# Patient Record
Sex: MALE | Race: WHITE | URBAN - NONMETROPOLITAN AREA
[De-identification: names, ages, dates, MRNs, and addresses within clinical notes are randomized per-mention and may not be internally consistent; named-entity substitution may affect disease eponyms.]

---

## 2017-03-29 LAB
CHOLESTEROL, TOTAL: 186 MG/DL
CHOLESTEROL/HDL RATIO: 4.8
HDLC SERPL-MCNC: 39 MG/DL (ref 35–70)
LDL CHOLESTEROL CALCULATED: 134.4 MG/DL (ref 0–160)
TRIGL SERPL-MCNC: 63 MG/DL
VLDLC SERPL CALC-MCNC: 13 MG/DL

## 2017-12-07 ENCOUNTER — TELEPHONE (OUTPATIENT)
Dept: FAMILY MEDICINE CLINIC | Age: 62
End: 2017-12-07

## 2017-12-07 DIAGNOSIS — M15.9 PRIMARY OSTEOARTHRITIS INVOLVING MULTIPLE JOINTS: Primary | ICD-10-CM

## 2017-12-07 NOTE — TELEPHONE ENCOUNTER
Pt called and stated he has been taking meloxicam for some time and was told that he would need a blood test to ck his kidneys since he is taking this medication and is requesting an order for labwork

## 2017-12-08 LAB
AGE FOR GFR: 62
ANION GAP SERPL CALCULATED.3IONS-SCNC: 15 MMOL/L
BUN BLDV-MCNC: 20 MG/DL
CALCIUM SERPL-MCNC: 9.7 MG/DL
CHLORIDE BLD-SCNC: 102 MMOL/L
CO2: 30 MMOL/L
CREAT SERPL-MCNC: 1.2 MG/DL
EGFR BF: 55 ML/MIN/1.73 M2
EGFR BM: 74 ML/MIN/1.73 M2
EGFR WF: 46 ML/MIN/1.73 M2
EGFR WM: 61 ML/MIN/1.73 M2
GLUCOSE: 103 MG/DL
POTASSIUM SERPL-SCNC: 4.8 MMOL/L
SODIUM BLD-SCNC: 142 MMOL/L

## 2017-12-11 DIAGNOSIS — G89.29 OTHER CHRONIC PAIN: Primary | ICD-10-CM

## 2017-12-11 RX ORDER — MELOXICAM 15 MG/1
15 TABLET ORAL DAILY
Qty: 30 TABLET | Refills: 3 | Status: SHIPPED | OUTPATIENT
Start: 2017-12-11 | End: 2018-03-15 | Stop reason: SDUPTHER

## 2017-12-11 RX ORDER — MELOXICAM 15 MG/1
1 TABLET ORAL DAILY
Refills: 0 | COMMUNITY
Start: 2017-11-11 | End: 2017-12-11 | Stop reason: SDUPTHER

## 2017-12-20 ENCOUNTER — OFFICE VISIT (OUTPATIENT)
Dept: FAMILY MEDICINE CLINIC | Age: 62
End: 2017-12-20
Payer: COMMERCIAL

## 2017-12-20 VITALS
WEIGHT: 207 LBS | BODY MASS INDEX: 28.04 KG/M2 | HEIGHT: 72 IN | DIASTOLIC BLOOD PRESSURE: 72 MMHG | HEART RATE: 60 BPM | SYSTOLIC BLOOD PRESSURE: 112 MMHG

## 2017-12-20 VITALS
OXYGEN SATURATION: 96 % | DIASTOLIC BLOOD PRESSURE: 76 MMHG | HEART RATE: 60 BPM | RESPIRATION RATE: 12 BRPM | WEIGHT: 209.44 LBS | TEMPERATURE: 97.9 F | BODY MASS INDEX: 28.37 KG/M2 | HEIGHT: 72 IN | SYSTOLIC BLOOD PRESSURE: 110 MMHG

## 2017-12-20 DIAGNOSIS — Z23 NEED FOR INFLUENZA VACCINATION: ICD-10-CM

## 2017-12-20 DIAGNOSIS — R06.02 SOB (SHORTNESS OF BREATH) ON EXERTION: ICD-10-CM

## 2017-12-20 DIAGNOSIS — J40 BRONCHITIS: Primary | ICD-10-CM

## 2017-12-20 PROBLEM — E78.5 HYPERLIPIDEMIA: Status: ACTIVE | Noted: 2017-12-20

## 2017-12-20 PROCEDURE — 99213 OFFICE O/P EST LOW 20 MIN: CPT | Performed by: NURSE PRACTITIONER

## 2017-12-20 PROCEDURE — 90471 IMMUNIZATION ADMIN: CPT | Performed by: NURSE PRACTITIONER

## 2017-12-20 PROCEDURE — 90688 IIV4 VACCINE SPLT 0.5 ML IM: CPT | Performed by: NURSE PRACTITIONER

## 2017-12-20 RX ORDER — LORATADINE 10 MG/1
10 TABLET ORAL DAILY
COMMUNITY
End: 2019-10-24 | Stop reason: ALTCHOICE

## 2017-12-20 RX ORDER — ALBUTEROL SULFATE 90 UG/1
2 AEROSOL, METERED RESPIRATORY (INHALATION) EVERY 4 HOURS PRN
Qty: 1 INHALER | Refills: 1 | Status: SHIPPED | OUTPATIENT
Start: 2017-12-20 | End: 2019-10-24 | Stop reason: ALTCHOICE

## 2017-12-20 RX ORDER — AZITHROMYCIN 250 MG/1
TABLET, FILM COATED ORAL
Qty: 1 PACKET | Refills: 0 | Status: SHIPPED | OUTPATIENT
Start: 2017-12-20 | End: 2017-12-24

## 2017-12-20 ASSESSMENT — ENCOUNTER SYMPTOMS
COUGH: 1
WHEEZING: 1
VOICE CHANGE: 1
SHORTNESS OF BREATH: 1
ABDOMINAL PAIN: 0
NAUSEA: 0
SINUS PRESSURE: 1
RHINORRHEA: 1
DIARRHEA: 0
SORE THROAT: 0

## 2017-12-20 NOTE — PROGRESS NOTES
[address] [address] [address]  Dept: [phone]  Dept Fax: [phone]      Rodrigo Awan is a 58 y.o. male who presents today for his medical conditions/complaints as noted below. Chief Complaint   Patient presents with    Chest Congestion     started over the weekend and can't shake it    Nasal Congestion    Cough     productive cough \" greenish/ yellow in color\"       HPI:     URI    This is a new problem. The current episode started in the past 7 days (6 days ago). There has been no fever. Associated symptoms include congestion, coughing (burns- prductive green/yellow), rhinorrhea (\"some\") and wheezing. Pertinent negatives include no abdominal pain, chest pain, diarrhea, headaches, nausea or sore throat. He has tried NSAIDs for the symptoms. The treatment provided moderate relief. Current Outpatient Prescriptions   Medication Sig Dispense Refill    albuterol sulfate HFA (PROAIR HFA) 108 (90 Base) MCG/ACT inhaler Inhale 2 puffs into the lungs every 4 hours as needed for Wheezing or Shortness of Breath Use with spacer 1 Inhaler 1    meloxicam (MOBIC) 15 MG tablet Take 1 tablet by mouth daily 30 tablet 3    loratadine (CLARITIN) 10 MG tablet Take 10 mg by mouth daily       No current facility-administered medications for this visit.       Allergies   Allergen Reactions    Penicillins Hives       Past Medical History:   Diagnosis Date    ACL tear     History of meniscal tear      Past Surgical History:   Procedure Laterality Date    APPENDECTOMY  1980    COLONOSCOPY  2004    for iron deficiency thought to be due to frequent blood donations    COLONOSCOPY  05/19/2014    normal exam - Dr Payne Flatten    x 2   [facility]     Social History     Social History    Marital status:      Spouse name: N/A    Number of children: N/A    Years of education: N/A     Occupational History    Not on file. Social History Main Topics    Smoking status: Never Smoker    Smokeless tobacco: Never Used    Alcohol use Not on file    Drug use: Unknown    Sexual activity: Not on file     Other Topics Concern    Not on file     Social History Narrative    No narrative on file     Family History   Problem Relation Age of Onset    High Blood Pressure Father     Other Father      knee replacement    Other Brother      hepatitis C       Subjective:      Review of Systems   Constitutional: Positive for appetite change (decreased- improving). Negative for chills, fatigue and fever. HENT: Positive for congestion, postnasal drip, rhinorrhea (\"some\"), sinus pressure (frontal) and voice change. Negative for sore throat. Respiratory: Positive for cough (burns- prductive green/yellow), shortness of breath (with activity) and wheezing. Cardiovascular: Negative for chest pain and palpitations. Gastrointestinal: Negative for abdominal pain, diarrhea and nausea. Neurological: Positive for light-headedness. Negative for dizziness and headaches. Objective:     /76 (Site: Left Arm, Position: Sitting, Cuff Size: Large Adult)   Pulse 60   Temp 97.9 °F (36.6 °C) (Tympanic)   Resp 12   Ht 6' 0.05\" (1.83 m)   Wt 209 lb 7 oz (95 kg)   SpO2 96%   BMI 28.37 kg/m²     Physical Exam   Constitutional: He is oriented to person, place, and time. He appears well-developed and well-nourished. HENT:   Head: Normocephalic. Right Ear: Tympanic membrane normal.   Left Ear: Tympanic membrane normal.   Nose: Mucosal edema, rhinorrhea and sinus tenderness present. Right sinus exhibits frontal sinus tenderness. Left sinus exhibits frontal sinus tenderness. Mouth/Throat: Posterior oropharyngeal erythema present. Eyes: Conjunctivae are normal.   Neck: Neck supple. No thyromegaly present. Cardiovascular: Normal rate, regular rhythm and normal heart sounds.     Pulmonary/Chest: Effort normal and

## 2018-03-15 ENCOUNTER — OFFICE VISIT (OUTPATIENT)
Dept: FAMILY MEDICINE CLINIC | Age: 63
End: 2018-03-15
Payer: COMMERCIAL

## 2018-03-15 VITALS
DIASTOLIC BLOOD PRESSURE: 80 MMHG | SYSTOLIC BLOOD PRESSURE: 120 MMHG | WEIGHT: 216 LBS | HEART RATE: 64 BPM | BODY MASS INDEX: 29.26 KG/M2

## 2018-03-15 DIAGNOSIS — Z00.00 EXAMINATION, MEDICAL, GENERAL: Primary | ICD-10-CM

## 2018-03-15 DIAGNOSIS — Z12.5 SCREENING PSA (PROSTATE SPECIFIC ANTIGEN): ICD-10-CM

## 2018-03-15 DIAGNOSIS — M17.11 PRIMARY OSTEOARTHRITIS OF RIGHT KNEE: ICD-10-CM

## 2018-03-15 DIAGNOSIS — Z11.59 NEED FOR HEPATITIS C SCREENING TEST: ICD-10-CM

## 2018-03-15 DIAGNOSIS — E78.2 MIXED HYPERLIPIDEMIA: ICD-10-CM

## 2018-03-15 PROCEDURE — 99396 PREV VISIT EST AGE 40-64: CPT | Performed by: FAMILY MEDICINE

## 2018-03-15 RX ORDER — MELOXICAM 15 MG/1
15 TABLET ORAL DAILY
Qty: 90 TABLET | Refills: 3 | Status: SHIPPED | OUTPATIENT
Start: 2018-03-15 | End: 2019-10-24 | Stop reason: ALTCHOICE

## 2018-03-15 ASSESSMENT — ENCOUNTER SYMPTOMS
ABDOMINAL PAIN: 0
WHEEZING: 0
ABDOMINAL DISTENTION: 0
CHEST TIGHTNESS: 0
SHORTNESS OF BREATH: 0
SORE THROAT: 0

## 2018-03-15 ASSESSMENT — PATIENT HEALTH QUESTIONNAIRE - PHQ9
1. LITTLE INTEREST OR PLEASURE IN DOING THINGS: 0
SUM OF ALL RESPONSES TO PHQ QUESTIONS 1-9: 0
SUM OF ALL RESPONSES TO PHQ9 QUESTIONS 1 & 2: 0
2. FEELING DOWN, DEPRESSED OR HOPELESS: 0

## 2018-03-15 NOTE — PROGRESS NOTES
spacer 1 Inhaler 1     No current facility-administered medications for this visit. Allergies   Allergen Reactions    Penicillins Hives       Health Maintenance   Topic Date Due    Hepatitis C screen  1955    Diabetes screen  09/28/1995    Shingles Vaccine (1 of 2 - 2 Dose Series) 09/28/2005    HIV screen  03/15/2028 (Originally 9/28/1970)    DTaP/Tdap/Td vaccine (2 - Td) 10/21/2018    Lipid screen  03/29/2022    Colon cancer screen colonoscopy  05/19/2024    Flu vaccine  Completed       Subjective:      Review of Systems   Constitutional: Negative for chills, diaphoresis and fever. HENT: Negative for sore throat. Respiratory: Negative for chest tightness, shortness of breath and wheezing. Cardiovascular: Negative for chest pain, palpitations and leg swelling. Gastrointestinal: Negative for abdominal distention and abdominal pain. Genitourinary: Negative for difficulty urinating, dysuria and hematuria. Musculoskeletal: Positive for arthralgias (right knee ). Hematological: Negative for adenopathy. Objective:     /80   Pulse 64   Wt 216 lb (98 kg)   BMI 29.26 kg/m²     Physical Exam   Constitutional: He appears well-developed and well-nourished. No distress. HENT:   Head: Normocephalic and atraumatic. Right Ear: Tympanic membrane normal.   Left Ear: Tympanic membrane normal.   Nose: Nose normal.   Mouth/Throat: No posterior oropharyngeal edema or posterior oropharyngeal erythema. Neck: Neck supple. Carotid bruit is not present. No thyromegaly present. Cardiovascular: Regular rhythm, S1 normal and S2 normal.    No murmur heard. Pulmonary/Chest: Breath sounds normal. He has no wheezes. He has no rhonchi. He has no rales. Abdominal: Soft. Bowel sounds are normal. There is no hepatosplenomegaly. There is no tenderness. Musculoskeletal: Normal range of motion. He exhibits no edema. Lymphadenopathy:     He has no cervical adenopathy.      He has no axillary

## 2018-03-19 LAB
A/G RATIO: 1.3 RATIO
AGE FOR GFR: 62
ALBUMIN: 4.6 G/DL
ALK PHOSPHATASE: 129 UNITS/L
ALT SERPL-CCNC: 48 UNITS/L
ANION GAP SERPL CALCULATED.3IONS-SCNC: 17 MMOL/L
AST SERPL-CCNC: 32 UNITS/L
BASOPHILS # BLD: 0.21 THOU/MM3
BILIRUB SERPL-MCNC: 0.8 MG/DL
BUN BLDV-MCNC: 26 MG/DL
CALCIUM SERPL-MCNC: 10 MG/DL
CHLORIDE BLD-SCNC: 102 MMOL/L
CHOLESTEROL/HDL RATIO: 4.8 RATIO
CHOLESTEROL: 205 MG/DL
CO2: 28 MMOL/L
CREAT SERPL-MCNC: 1.1 MG/DL
DIFFERENTIAL: AUTOMATED DIFF
EGFR BF: 61 ML/MIN/1.73 M2
EGFR BM: 82 ML/MIN/1.73 M2
EGFR WF: 50 ML/MIN/1.73 M2
EGFR WM: 68 ML/MIN/1.73 M2
EOSINOPHIL # BLD: 0.33 THOU/MM3
GLOBULIN: 3.5 G/DL
GLUCOSE: 84 MG/DL
HCT VFR BLD CALC: 46.3 %
HDL, DIRECT: 43 MG/DL
HEMOGLOBIN: 15.5 G/DL
HEPATITIS C IGG: NORMAL
LDL CHOLESTEROL CALCULATED: 143 MG/DL
LYMPHOCYTES # BLD: 1.43 THOU/MM3
MCH RBC QN AUTO: 32.2 PG
MCHC RBC AUTO-ENTMCNC: 33.5 G/DL
MCV RBC AUTO: 96.1 FL
MONOCYTES # BLD: 0.41 THOU/MM3
NEUTROPHILS: 3.33 THOU/MM3
PDW BLD-RTO: 12.6 %
PLATELET # BLD: 152 THOU/MM3
PMV BLD AUTO: 9.3 FL
POTASSIUM SERPL-SCNC: 4.3 MMOL/L
RBC # BLD: 4.82 M/UL
SCREENING PSA: 1.05 NG/ML
SIGNAL/CUTOFF: NORMAL
SODIUM BLD-SCNC: 143 MMOL/L
TOTAL PROTEIN: 8.1 G/DL
TRIGL SERPL-MCNC: 95 MG/DL
VLDLC SERPL CALC-MCNC: 19 MG/DL
WBC # BLD: 5.71 THOU/ML3

## 2018-03-20 DIAGNOSIS — Z11.59 NEED FOR HEPATITIS C SCREENING TEST: ICD-10-CM

## 2018-06-06 LAB
ALBUMIN: 3.7 G/DL (ref 3.5–5)
ALP BLD-CCNC: 120 U/L (ref 45–117)
ALT SERPL-CCNC: 24 U/L (ref 12–78)
APPEARANCE: CLEAR
AST SERPL-CCNC: 23 U/L (ref 15–37)
BILIRUB SERPL-MCNC: 0.5 MG/DL (ref 0–1)
BILIRUBIN, URINE: NEGATIVE
BUN BLDV-MCNC: 22 MG/DL (ref 7–18)
CALCIUM SERPL-MCNC: 9.3 MG/DL (ref 8.5–10.1)
CHLORIDE BLD-SCNC: 108 MMOL/L (ref 97–107)
CO2: 27 MMOL/L (ref 21–32)
COLOR, URINE: YELLOW
CREAT SERPL-MCNC: 1.26 MG/DL (ref 0.7–1.3)
GFR CALCULATED: > 60
GLUCOSE URINE: NEGATIVE G/DL
GLUCOSE: 89 MG/DL (ref 70–99)
HCT VFR BLD CALC: 43.3 % (ref 39.8–49.4)
HEMOGLOBIN: 14.7 G/DL (ref 13.5–16.8)
INR BLD: 1.02 (ref 0.86–1.15)
KETONES, URINE: NEGATIVE MG/DL
LEUKOCYTE ESTERASE, URINE: NEGATIVE
MCH RBC QN AUTO: 32.3 PG (ref 27.9–33.7)
MCHC RBC AUTO-ENTMCNC: 34 G/DL (ref 33–35.2)
MCV RBC AUTO: 95 FL (ref 83.9–97)
NITRITE, URINE: NEGATIVE
OCCULT BLOOD,URINE: NEGATIVE
PARTIAL THROMBOPLASTIN TIME: 29 SECONDS (ref 24–34)
PDW BLD-RTO: 13.5 % (ref 11.7–15.5)
PH, URINE: 6
PLATELET # BLD: 179 10'3/UL (ref 144–327)
PMV BLD AUTO: 8.8 FL (ref 7.2–10.4)
POTASSIUM SERPL-SCNC: 4.4 MMOL/L (ref 3.5–5.1)
PROTEIN UA: NEGATIVE MG/DL
PROTHROMBIN TIME: 10.7 SECONDS (ref 9.3–12.3)
RBC # BLD: 4.56 10'6/UL (ref 4.24–5.64)
RBC URINE: NORMAL /HPF (ref 0–3)
SODIUM BLD-SCNC: 141 MMOL/L (ref 136–145)
SPECIFIC GRAVITY, URINE: <= 1.005 (ref 1–1.03)
TOTAL PROTEIN: 7.8 G/DL (ref 6.4–8.2)
UROBILINOGEN, URINE: 0.2 EU/DL
WBC URINE: NORMAL /HPF (ref 0–4)
WBC: 4.6 10'3/UL (ref 4.1–10.2)

## 2018-06-14 ENCOUNTER — OFFICE VISIT (OUTPATIENT)
Dept: FAMILY MEDICINE CLINIC | Age: 63
End: 2018-06-14
Payer: COMMERCIAL

## 2018-06-14 VITALS
HEART RATE: 64 BPM | BODY MASS INDEX: 29.39 KG/M2 | SYSTOLIC BLOOD PRESSURE: 124 MMHG | DIASTOLIC BLOOD PRESSURE: 84 MMHG | WEIGHT: 217 LBS

## 2018-06-14 DIAGNOSIS — Z01.818 PREOP GENERAL PHYSICAL EXAM: Primary | ICD-10-CM

## 2018-06-14 DIAGNOSIS — E78.2 MIXED HYPERLIPIDEMIA: ICD-10-CM

## 2018-06-14 DIAGNOSIS — M15.9 PRIMARY OSTEOARTHRITIS INVOLVING MULTIPLE JOINTS: ICD-10-CM

## 2018-06-14 DIAGNOSIS — R94.31 ABNORMAL EKG: ICD-10-CM

## 2018-06-14 PROCEDURE — 99242 OFF/OP CONSLTJ NEW/EST SF 20: CPT | Performed by: FAMILY MEDICINE

## 2018-06-14 PROCEDURE — 93000 ELECTROCARDIOGRAM COMPLETE: CPT | Performed by: FAMILY MEDICINE

## 2018-06-14 ASSESSMENT — ENCOUNTER SYMPTOMS
SHORTNESS OF BREATH: 0
WHEEZING: 0
CHEST TIGHTNESS: 0
SORE THROAT: 0
ABDOMINAL DISTENTION: 0
ABDOMINAL PAIN: 0

## 2018-06-29 LAB — ANTI-A: NORMAL

## 2018-07-09 LAB — ANTI-A: NORMAL

## 2018-07-10 LAB
ALBUMIN: 3.1 G/DL (ref 3.5–5)
ALP BLD-CCNC: 109 U/L (ref 45–117)
ALT SERPL-CCNC: 17 U/L (ref 12–78)
AST SERPL-CCNC: 28 U/L (ref 15–37)
BILIRUB SERPL-MCNC: 0.7 MG/DL (ref 0–1)
BUN BLDV-MCNC: 19 MG/DL (ref 7–18)
CALCIUM SERPL-MCNC: 8.7 MG/DL (ref 8.5–10.1)
CHLORIDE BLD-SCNC: 107 MMOL/L (ref 97–107)
CO2: 29 MMOL/L (ref 21–32)
CREAT SERPL-MCNC: 1.13 MG/DL (ref 0.7–1.3)
GFR CALCULATED: > 60
GLUCOSE: 102 MG/DL (ref 70–99)
HCT VFR BLD CALC: 39.5 % (ref 39.8–49.4)
HEMOGLOBIN: 13.6 G/DL (ref 13.5–16.8)
POTASSIUM SERPL-SCNC: 4.5 MMOL/L (ref 3.5–5.1)
SODIUM BLD-SCNC: 141 MMOL/L (ref 136–145)
SURGICAL PATHOLOGY REPORT: NORMAL
TOTAL PROTEIN: 6.5 G/DL (ref 6.4–8.2)

## 2018-07-11 LAB
HCT VFR BLD CALC: 41.7 % (ref 39.8–49.4)
HEMOGLOBIN: 14.2 G/DL (ref 13.5–16.8)

## 2019-01-14 ENCOUNTER — OFFICE VISIT (OUTPATIENT)
Dept: FAMILY MEDICINE CLINIC | Age: 64
End: 2019-01-14
Payer: COMMERCIAL

## 2019-01-14 VITALS
DIASTOLIC BLOOD PRESSURE: 76 MMHG | HEART RATE: 85 BPM | SYSTOLIC BLOOD PRESSURE: 100 MMHG | OXYGEN SATURATION: 98 % | TEMPERATURE: 98.8 F | BODY MASS INDEX: 29.12 KG/M2 | WEIGHT: 215 LBS

## 2019-01-14 DIAGNOSIS — J06.9 VIRAL URI: Primary | ICD-10-CM

## 2019-01-14 DIAGNOSIS — J04.0 LARYNGITIS: ICD-10-CM

## 2019-01-14 DIAGNOSIS — R05.9 COUGH: ICD-10-CM

## 2019-01-14 PROCEDURE — 99213 OFFICE O/P EST LOW 20 MIN: CPT | Performed by: FAMILY MEDICINE

## 2019-01-14 RX ORDER — METHYLPREDNISOLONE 4 MG/1
TABLET ORAL
Qty: 1 KIT | Refills: 0 | Status: SHIPPED | OUTPATIENT
Start: 2019-01-14 | End: 2019-01-20

## 2019-01-15 ASSESSMENT — ENCOUNTER SYMPTOMS
RHINORRHEA: 1
COUGH: 1
TROUBLE SWALLOWING: 0
VOICE CHANGE: 1
SINUS PRESSURE: 0
SINUS PAIN: 0
SORE THROAT: 1
SHORTNESS OF BREATH: 0
WHEEZING: 0

## 2019-10-24 ENCOUNTER — TELEPHONE (OUTPATIENT)
Dept: FAMILY MEDICINE CLINIC | Age: 64
End: 2019-10-24

## 2019-10-24 ENCOUNTER — OFFICE VISIT (OUTPATIENT)
Dept: FAMILY MEDICINE CLINIC | Age: 64
End: 2019-10-24
Payer: COMMERCIAL

## 2019-10-24 VITALS
SYSTOLIC BLOOD PRESSURE: 110 MMHG | OXYGEN SATURATION: 98 % | DIASTOLIC BLOOD PRESSURE: 82 MMHG | BODY MASS INDEX: 29.01 KG/M2 | TEMPERATURE: 97.7 F | WEIGHT: 214.2 LBS | HEART RATE: 54 BPM

## 2019-10-24 DIAGNOSIS — J04.0 LARYNGITIS: Primary | ICD-10-CM

## 2019-10-24 PROCEDURE — 99213 OFFICE O/P EST LOW 20 MIN: CPT | Performed by: NURSE PRACTITIONER

## 2019-10-24 RX ORDER — PREDNISONE 20 MG/1
20 TABLET ORAL DAILY
Qty: 5 TABLET | Refills: 0 | Status: SHIPPED | OUTPATIENT
Start: 2019-10-24 | End: 2019-10-29

## 2019-10-24 SDOH — HEALTH STABILITY: MENTAL HEALTH: HOW OFTEN DO YOU HAVE A DRINK CONTAINING ALCOHOL?: MONTHLY OR LESS

## 2019-10-24 ASSESSMENT — ENCOUNTER SYMPTOMS
VOICE CHANGE: 1
SWOLLEN GLANDS: 0
COUGH: 1
SORE THROAT: 0
TROUBLE SWALLOWING: 0

## 2019-12-04 ENCOUNTER — OFFICE VISIT (OUTPATIENT)
Dept: FAMILY MEDICINE CLINIC | Age: 64
End: 2019-12-04
Payer: COMMERCIAL

## 2019-12-04 VITALS — TEMPERATURE: 96.7 F

## 2019-12-04 DIAGNOSIS — Z23 NEED FOR PROPHYLACTIC VACCINATION AND INOCULATION AGAINST INFLUENZA: Primary | ICD-10-CM

## 2019-12-04 PROCEDURE — 90686 IIV4 VACC NO PRSV 0.5 ML IM: CPT | Performed by: FAMILY MEDICINE

## 2019-12-04 PROCEDURE — 90471 IMMUNIZATION ADMIN: CPT | Performed by: FAMILY MEDICINE

## 2020-03-24 ENCOUNTER — OFFICE VISIT (OUTPATIENT)
Dept: FAMILY MEDICINE CLINIC | Age: 65
End: 2020-03-24
Payer: COMMERCIAL

## 2020-03-24 ENCOUNTER — HOSPITAL ENCOUNTER (OUTPATIENT)
Age: 65
Setting detail: SPECIMEN
Discharge: HOME | End: 2020-03-24
Payer: COMMERCIAL

## 2020-03-24 VITALS
HEIGHT: 72 IN | SYSTOLIC BLOOD PRESSURE: 104 MMHG | BODY MASS INDEX: 29.12 KG/M2 | OXYGEN SATURATION: 97 % | HEART RATE: 49 BPM | WEIGHT: 215 LBS | DIASTOLIC BLOOD PRESSURE: 70 MMHG

## 2020-03-24 LAB
ABSOLUTE EOS #: 0.24 K/UL (ref 0–0.44)
ABSOLUTE IMMATURE GRANULOCYTE: 0.03 K/UL (ref 0–0.3)
ABSOLUTE LYMPH #: 1.34 K/UL (ref 1.1–3.7)
ABSOLUTE MONO #: 0.53 K/UL (ref 0.1–1.2)
ALBUMIN SERPL-MCNC: 3.9 G/DL (ref 3.5–5.2)
ALBUMIN/GLOBULIN RATIO: 1.2 (ref 1–2.5)
ALP BLD-CCNC: 128 U/L (ref 40–129)
ALT SERPL-CCNC: 19 U/L (ref 5–41)
ANION GAP SERPL CALCULATED.3IONS-SCNC: 10 MMOL/L (ref 9–17)
AST SERPL-CCNC: 29 U/L
BASOPHILS # BLD: 2 % (ref 0–2)
BASOPHILS ABSOLUTE: 0.09 K/UL (ref 0–0.2)
BILIRUB SERPL-MCNC: 0.43 MG/DL (ref 0.3–1.2)
BUN BLDV-MCNC: 23 MG/DL (ref 8–23)
BUN/CREAT BLD: 21 (ref 9–20)
CALCIUM SERPL-MCNC: 9.4 MG/DL (ref 8.6–10.4)
CHLORIDE BLD-SCNC: 100 MMOL/L (ref 98–107)
CHOLESTEROL/HDL RATIO: 5.1
CHOLESTEROL: 168 MG/DL
CO2: 27 MMOL/L (ref 20–31)
CREAT SERPL-MCNC: 1.07 MG/DL (ref 0.7–1.2)
DIFFERENTIAL TYPE: ABNORMAL
EOSINOPHILS RELATIVE PERCENT: 5 % (ref 1–4)
GFR AFRICAN AMERICAN: >60 ML/MIN
GFR NON-AFRICAN AMERICAN: >60 ML/MIN
GFR SERPL CREATININE-BSD FRML MDRD: ABNORMAL ML/MIN/{1.73_M2}
GFR SERPL CREATININE-BSD FRML MDRD: ABNORMAL ML/MIN/{1.73_M2}
GLUCOSE FASTING: 93 MG/DL (ref 70–99)
HCT VFR BLD CALC: 45.2 % (ref 40.7–50.3)
HDLC SERPL-MCNC: 33 MG/DL
HEMOGLOBIN: 14.6 G/DL (ref 13–17)
IMMATURE GRANULOCYTES: 1 %
LDL CHOLESTEROL: 122 MG/DL (ref 0–130)
LYMPHOCYTES # BLD: 26 % (ref 24–43)
MCH RBC QN AUTO: 31.2 PG (ref 25.2–33.5)
MCHC RBC AUTO-ENTMCNC: 32.3 G/DL (ref 25.2–33.5)
MCV RBC AUTO: 96.6 FL (ref 82.6–102.9)
MONOCYTES # BLD: 10 % (ref 3–12)
NRBC AUTOMATED: 0 PER 100 WBC
PDW BLD-RTO: 12.7 % (ref 11.8–14.4)
PLATELET # BLD: 233 K/UL (ref 138–453)
PLATELET ESTIMATE: ABNORMAL
PMV BLD AUTO: 10.9 FL (ref 8.1–13.5)
POTASSIUM SERPL-SCNC: 4.8 MMOL/L (ref 3.7–5.3)
PROSTATE SPECIFIC ANTIGEN: 1 UG/L
RBC # BLD: 4.68 M/UL (ref 4.21–5.77)
RBC # BLD: ABNORMAL 10*6/UL
SEG NEUTROPHILS: 56 % (ref 36–65)
SEGMENTED NEUTROPHILS ABSOLUTE COUNT: 2.85 K/UL (ref 1.5–8.1)
SODIUM BLD-SCNC: 137 MMOL/L (ref 135–144)
TOTAL PROTEIN: 7.2 G/DL (ref 6.4–8.3)
TRIGL SERPL-MCNC: 67 MG/DL
VLDLC SERPL CALC-MCNC: ABNORMAL MG/DL (ref 1–30)
WBC # BLD: 5.1 K/UL (ref 3.5–11.3)
WBC # BLD: ABNORMAL 10*3/UL

## 2020-03-24 PROCEDURE — G0103 PSA SCREENING: HCPCS

## 2020-03-24 PROCEDURE — 99396 PREV VISIT EST AGE 40-64: CPT | Performed by: FAMILY MEDICINE

## 2020-03-24 PROCEDURE — 36415 COLL VENOUS BLD VENIPUNCTURE: CPT

## 2020-03-24 PROCEDURE — 80061 LIPID PANEL: CPT

## 2020-03-24 PROCEDURE — 85025 COMPLETE CBC W/AUTO DIFF WBC: CPT

## 2020-03-24 PROCEDURE — 80053 COMPREHEN METABOLIC PANEL: CPT

## 2020-03-24 RX ORDER — UMECLIDINIUM BROMIDE AND VILANTEROL TRIFENATATE 62.5; 25 UG/1; UG/1
1 POWDER RESPIRATORY (INHALATION) DAILY
Qty: 2 EACH | Refills: 0 | COMMUNITY
Start: 2020-03-24 | End: 2021-06-04

## 2020-03-24 RX ORDER — LORATADINE 10 MG/1
10 TABLET ORAL DAILY
Qty: 30 TABLET | Refills: 0 | COMMUNITY
Start: 2020-03-24

## 2020-03-24 ASSESSMENT — ENCOUNTER SYMPTOMS
COUGH: 1
SORE THROAT: 0
SHORTNESS OF BREATH: 0
WHEEZING: 0
RHINORRHEA: 0
SINUS PAIN: 0
ABDOMINAL DISTENTION: 0
ABDOMINAL PAIN: 0
BLOOD IN STOOL: 0

## 2020-03-24 ASSESSMENT — PATIENT HEALTH QUESTIONNAIRE - PHQ9
SUM OF ALL RESPONSES TO PHQ QUESTIONS 1-9: 0
2. FEELING DOWN, DEPRESSED OR HOPELESS: 0
1. LITTLE INTEREST OR PLEASURE IN DOING THINGS: 0
SUM OF ALL RESPONSES TO PHQ QUESTIONS 1-9: 0
SUM OF ALL RESPONSES TO PHQ9 QUESTIONS 1 & 2: 0

## 2020-03-24 NOTE — PROGRESS NOTES
hematuria. Musculoskeletal: Arthralgias: right knee is doing well. Hematological: Negative for adenopathy. Objective:     /70   Pulse (!) 49   Ht 6' (1.829 m)   Wt 215 lb (97.5 kg)   SpO2 97%   BMI 29.16 kg/m²     Physical Exam  Vitals signs reviewed. Constitutional:       General: He is not in acute distress. Appearance: He is well-developed. HENT:      Head: Normocephalic and atraumatic. Right Ear: Tympanic membrane normal.      Left Ear: Tympanic membrane normal.      Nose: Nose normal.      Mouth/Throat:      Mouth: Mucous membranes are moist.      Pharynx: Oropharynx is clear. No posterior oropharyngeal erythema. Neck:      Musculoskeletal: Neck supple. Thyroid: No thyromegaly. Vascular: No carotid bruit. Cardiovascular:      Rate and Rhythm: Regular rhythm. Pulses:           Popliteal pulses are 2+ on the right side and 2+ on the left side. Posterior tibial pulses are 2+ on the right side and 2+ on the left side. Heart sounds: S1 normal and S2 normal. No murmur. Pulmonary:      Breath sounds: Normal breath sounds. No wheezing, rhonchi or rales. Abdominal:      General: Bowel sounds are normal.      Palpations: Abdomen is soft. Tenderness: There is no abdominal tenderness. Musculoskeletal: Normal range of motion. Right lower leg: No edema. Left lower leg: No edema. Lymphadenopathy:      Cervical: No cervical adenopathy. Skin:     Findings: No rash. Assessment:      Diagnosis Orders   1. Physical exam, routine  CBC Auto Differential    Comprehensive Metabolic Panel, Fasting   2. Cough  loratadine (CLARITIN) 10 MG tablet    XR CHEST STANDARD (2 VW)   3. Screening PSA (prostate specific antigen)  PSA Screening   4. Encounter for screening for lipid disorder  Lipid Panel            POC Testing Results (If Applicable):  No results found for this visit on 03/24/20. Plan:     CBC CMP and lipid panel today.   Also screening PSA since it has been 2 years. We will get a chest x-ray due to his sensation of chest congestion and a cough. But I recommended he try over-the-counter Claritin or equivalent for 2 to 4 weeks for symptoms. If that does not help try Anoro-2 samples given and demonstrated     Return in one year and prn    Orders Given:  Orders Placed This Encounter   Procedures    XR CHEST STANDARD (2 VW)     Standing Status:   Future     Standing Expiration Date:   3/24/2021    Lipid Panel     Standing Status:   Future     Number of Occurrences:   1     Standing Expiration Date:   3/24/2021     Order Specific Question:   Is Patient Fasting?/# of Hours     Answer:   Yes, 12 hours    CBC Auto Differential     Standing Status:   Future     Number of Occurrences:   1     Standing Expiration Date:   3/24/2021    Comprehensive Metabolic Panel, Fasting     Standing Status:   Future     Number of Occurrences:   1     Standing Expiration Date:   3/24/2021    PSA Screening     Standing Status:   Future     Number of Occurrences:   1     Standing Expiration Date:   3/24/2021     Prescriptions:    Orders Placed This Encounter   Medications    loratadine (CLARITIN) 10 MG tablet     Sig: Take 1 tablet by mouth daily     Dispense:  30 tablet     Refill:  0    umeclidinium-vilanterol (ANORO ELLIPTA) 62.5-25 MCG/INH AEPB inhaler     Sig: Inhale 1 puff into the lungs daily     Dispense:  2 each     Refill:  0        Return in about 1 year (around 3/24/2021). Electronically signed by Yamel Gabriel MD on3/24/2020. **This report has been created using voice recognition software. It may contain minor errors which are inherent in voice recognition technology. **

## 2020-03-26 ENCOUNTER — TELEPHONE (OUTPATIENT)
Dept: FAMILY MEDICINE CLINIC | Age: 65
End: 2020-03-26

## 2020-03-26 NOTE — TELEPHONE ENCOUNTER
Spoke with pt's wife and wanted to inform you that pt has been having issues with sleeping and having some sleep apnea but pt is very reluctant and his wife is concerned due to family history of sleep apnea.  Wife would like pt to have a sleep study

## 2020-11-06 ENCOUNTER — NURSE ONLY (OUTPATIENT)
Dept: FAMILY MEDICINE CLINIC | Age: 65
End: 2020-11-06
Payer: COMMERCIAL

## 2020-11-06 PROCEDURE — 90471 IMMUNIZATION ADMIN: CPT | Performed by: INTERNAL MEDICINE

## 2020-11-06 PROCEDURE — 90694 VACC AIIV4 NO PRSRV 0.5ML IM: CPT | Performed by: INTERNAL MEDICINE

## 2020-11-06 NOTE — PROGRESS NOTES
Have you had an allergic reaction to the flu (influenza) shot? no  Are you allergic to eggs or any component of the flu vaccine? no  Do you have a history of Guillain-Ely Syndrome (GBS), which is paralysis after receiving the flu vaccine? no  Are you feeling well today? yes  Flu vaccine given as ordered. Patient tolerated it well. No questions re: VIS information.

## 2021-06-04 ENCOUNTER — OFFICE VISIT (OUTPATIENT)
Dept: SURGERY | Age: 66
End: 2021-06-04
Payer: COMMERCIAL

## 2021-06-04 VITALS
TEMPERATURE: 99 F | WEIGHT: 209.9 LBS | HEART RATE: 64 BPM | DIASTOLIC BLOOD PRESSURE: 74 MMHG | BODY MASS INDEX: 27.82 KG/M2 | HEIGHT: 73 IN | SYSTOLIC BLOOD PRESSURE: 114 MMHG

## 2021-06-04 DIAGNOSIS — R13.14 PHARYNGOESOPHAGEAL DYSPHAGIA: Primary | ICD-10-CM

## 2021-06-04 PROCEDURE — 99203 OFFICE O/P NEW LOW 30 MIN: CPT | Performed by: SURGERY

## 2021-06-04 ASSESSMENT — ENCOUNTER SYMPTOMS
CONSTIPATION: 0
SORE THROAT: 0
SHORTNESS OF BREATH: 0
ABDOMINAL DISTENTION: 0
CHOKING: 0
SINUS PRESSURE: 0
NAUSEA: 0
ABDOMINAL PAIN: 0
VOMITING: 0
SINUS PAIN: 0
CHEST TIGHTNESS: 0
COUGH: 1
BACK PAIN: 0
TROUBLE SWALLOWING: 1
WHEEZING: 0
DIARRHEA: 0
VOICE CHANGE: 0

## 2021-06-04 NOTE — PROGRESS NOTES
Complains of intermitant dysphagia x 10 years. About once a week will get the sensation of food getting stuck as he as swallowing at the base of her neck. It is most frequently occurs with bread and beef. There are no foods that he avoids. No weight loss. No heart burn, dyspepsia or regurgitation. Goes on down if he drinks water; has never had to regurgitate the food. Non-smoker, and no other tobacco. Drinks EtOH about 1 time a month. No coughing, choking, wheezing. Past Medical History:   Diagnosis Date    ACL tear     History of meniscal tear      Past Surgical History:   Procedure Laterality Date    APPENDECTOMY  1980    COLONOSCOPY  2004    for iron deficiency thought to be due to frequent blood donations    COLONOSCOPY  05/19/2014    normal exam - Dr Ebony Nguyen 1976    x 2   Jennifer Locke Right     Dr Alma Amador     Current Outpatient Medications   Medication Sig Dispense Refill    loratadine (CLARITIN) 10 MG tablet Take 1 tablet by mouth daily 30 tablet 0    umeclidinium-vilanterol (ANORO ELLIPTA) 62.5-25 MCG/INH AEPB inhaler Inhale 1 puff into the lungs daily (Patient not taking: Reported on 6/4/2021) 2 each 0    Multiple Vitamins-Minerals (MULTIVITAMIN ADULTS 50+ PO) Take by mouth       No current facility-administered medications for this visit. Allergies   Allergen Reactions    Penicillins Hives     Social History     Tobacco Use    Smoking status: Never Smoker    Smokeless tobacco: Never Used   Substance Use Topics    Alcohol use: Yes     Alcohol/week: 1.0 standard drinks     Types: 1 Glasses of wine per week     Comment: monthly    Drug use: Never     Family History   Problem Relation Age of Onset    Other Mother         She has dysphagia treated with BOTOX - achalasia? ?    High Blood Pressure Father     Other Father         knee replacement    Other Brother         hepatitis C     Review of Systems Constitutional: Negative for activity change, appetite change, chills, fever and unexpected weight change. HENT: Positive for trouble swallowing. Negative for sinus pressure, sinus pain, sneezing, sore throat and voice change. Respiratory: Positive for cough. Negative for choking, chest tightness, shortness of breath and wheezing. Covid in March   Gastrointestinal: Negative for abdominal distention, abdominal pain, constipation, diarrhea, nausea and vomiting. Endocrine: Negative for polydipsia, polyphagia and polyuria. Genitourinary: Negative for difficulty urinating, dysuria, frequency and hematuria. Musculoskeletal: Negative for arthralgias, back pain, joint swelling, myalgias and neck stiffness. Neurological: Negative for dizziness, seizures, syncope, speech difficulty, weakness, light-headedness, numbness and headaches. Hematological: Does not bruise/bleed easily. /74   Pulse 64   Temp 99 °F (37.2 °C)   Ht 6' 1\" (1.854 m)   Wt 209 lb 14.4 oz (95.2 kg)   BMI 27.69 kg/m²     Physical Exam  Constitutional:       General: He is not in acute distress. Appearance: He is not ill-appearing, toxic-appearing or diaphoretic. HENT:      Mouth/Throat:      Mouth: Mucous membranes are moist.      Pharynx: Oropharynx is clear. Eyes:      General: No scleral icterus. Conjunctiva/sclera: Conjunctivae normal.      Pupils: Pupils are equal, round, and reactive to light. Cardiovascular:      Rate and Rhythm: Normal rate. Pulmonary:      Effort: Pulmonary effort is normal.      Breath sounds: Wheezing ( left base, clear post tussively) present. Abdominal:      General: Abdomen is flat. Bowel sounds are normal. There is no distension. Palpations: There is no mass. Tenderness: There is no abdominal tenderness. Hernia: No hernia is present. Musculoskeletal:      Cervical back: Normal range of motion and neck supple. No rigidity or tenderness.    Lymphadenopathy: